# Patient Record
(demographics unavailable — no encounter records)

---

## 2024-12-01 NOTE — LETTER BODY
[Dear  ___] : Dear  [unfilled], [Courtesy Letter:] : I had the pleasure of seeing your patient, [unfilled], in my office today. [Please see my note below.] : Please see my note below. [Sincerely,] : Sincerely, [FreeTextEntry3] : Denver Meyer MD  of Urology [facility]  [facility] Offices: [address] [facility], [address] [place] [address], [facility] [address] [facility], [place] [address]  TEL: [phone] FAX: [phone]

## 2024-12-01 NOTE — HISTORY OF PRESENT ILLNESS
[FreeTextEntry1] : 11/19/2024:  83-year-old male presents for follow-up.  Visit conducted with the help of Thai-speaking staff: HERNAN Lynn.  Taking Flomax and Dutasteride.   Uses Lotrisone cream as needed.  Daytime frequency 3-4 times and nocturia 5-6 times.  Denies dysuria, hematuria, lower abdominal or flank pain, nausea, vomiting, fever, chills or rigors.   09/12/2023: Had to go to [facility] with difficulty urinating a few weeks ago.  Required straight cath. Was told that was medicine related.  Since then, no problem. Was asked to follow up with urologist.  In October 2021 seen for Epididymo-orchitis.   In September 2021 seen for Urinary retention.  Went to [facility] for fever and lower extremity infection.  Had difficulty urinating and required indwelling Romero catheter.   Initially seen for history of Benign Prostatic Hyperplasia.  On Flomax and Dutasteride. Was following with Dr Tim Corado. No longer accepted Insurance.  Reported reasonable stream, urinates 4-5 x or so during the day. Nocturia of 4-5 x.  Denied hesitancy, straining, intermittency, urgency, incontinence, sense of incomplete emptying. Denied dysuria, hematuria, lower abdominal or flank pain, fever, chills or rigors. No family history of Prostate cancer.

## 2024-12-01 NOTE — END OF VISIT
[Time Spent: ___ minutes] : I have spent [unfilled] minutes of time on the encounter which excludes teaching and separately reported services. [FreeTextEntry3] : Parts of this note were generated by using [employer] services and Dragon medical dictation software.  A reasonable effort was made to proofread and correct its contents, but typos and mistakes may still remain. If there are any questions or points of clarification needed, please contact my office.   Prior to appointment and during encounter with patient extensive medical records were reviewed including but not limited to, hospital records, outpatient records, imaging results and lab data if available. During this appointment the patient was examined, diagnoses were discussed and explained in a face-to-face manner. In addition, extensive time was spent reviewing aforementioned diagnostic studies. Counseling including test results, differential diagnoses, treatment options, risk and benefits, lifestyle changes, current condition, and current medications was performed. Patient's questions and concerns were addressed. Patient verbalized understanding of the treatment plan. Time spent is for reviewing chart, labs and images if available, counseling and care coordination.

## 2024-12-01 NOTE — LETTER BODY
If you pass out or start to throw blood or have saba blood in the stools please call 911 and go to nearest er    [Dear  ___] : Dear  [unfilled], [Courtesy Letter:] : I had the pleasure of seeing your patient, [unfilled], in my office today. [Please see my note below.] : Please see my note below. [Sincerely,] : Sincerely, [FreeTextEntry3] : Denver Meyer MD  of Urology [facility]  [facility] Offices: [address]", [address] [place] [address], [facility] [address] [facility], [place] [address]  TEL: [phone] FAX: [phone]

## 2024-12-01 NOTE — HISTORY OF PRESENT ILLNESS
[FreeTextEntry1] : 11/19/2024:  83-year-old male presents for follow-up.  Visit conducted with the help of Malay-speaking staff: HERNAN Lynn.  Taking Flomax and Dutasteride.   Uses Lotrisone cream as needed.  Daytime frequency 3-4 times and nocturia 5-6 times.  Denies dysuria, hematuria, lower abdominal or flank pain, nausea, vomiting, fever, chills or rigors.   09/12/2023: Had to go to [facility] with difficulty urinating a few weeks ago.  Required straight cath. Was told that was medicine related.  Since then, no problem. Was asked to follow up with urologist.  In October 2021 seen for Epididymo-orchitis.   In September 2021 seen for Urinary retention.  Went to [facility] for fever and lower extremity infection.  Had difficulty urinating and required indwelling Romero catheter.   Initially seen for history of Benign Prostatic Hyperplasia.  On Flomax and Dutasteride. Was following with Dr Tim Corado. No longer accepted Insurance.  Reported reasonable stream, urinates 4-5 x or so during the day. Nocturia of 4-5 x.  Denied hesitancy, straining, intermittency, urgency, incontinence, sense of incomplete emptying. Denied dysuria, hematuria, lower abdominal or flank pain, fever, chills or rigors. No family history of Prostate cancer.

## 2024-12-01 NOTE — ASSESSMENT
[FreeTextEntry1] : Benign Prostatic Hyperplasia: Did not have to urinate today for uroflow.   Discussed treatment options. Will continue Flomax and Dutasteride.    Balanoposthitis: Discussed treatment options. Will continue Lotrisone cream PRN.    Return to office in 6 months or sooner if there are any issues. Will do uroflow and check PVR.

## 2024-12-01 NOTE — ADDENDUM
[FreeTextEntry1] :  Deanna HER assisted in documentation on 11/19/2024  acting as a scribe for Dr. Denver Meyer.

## 2024-12-01 NOTE — REASON FOR VISIT
[Follow-up Visit ___] : a follow-up visit  for [unfilled] [Interpreters_IDNumber] : [id number] [Interpreters_FullName] :    Rl  [FreeTextEntry3] : Kuwaiti

## 2024-12-01 NOTE — END OF VISIT
[Time Spent: ___ minutes] : I have spent [unfilled] minutes of time on the encounter which excludes teaching and separately reported services. [FreeTextEntry3] : Parts of this note were generated by using [employer] services and Dragon medical dictation software.  A reasonable effort was made to proofread and correct its contents, but typos and mistakes may still remain. If there are any questions or points of clarification needed, please contact my office.   Prior to appointment and during encounter with patient extensive medical records were reviewed including but not limited to, hospital records, outpatient records, imaging results and lab data if available. During this appointment the patient was examined, diagnoses were discussed and explained in a face-to-face manner. In addition, extensive time was spent reviewing aforementioned diagnostic studies. Counseling including test results, differential diagnoses, treatment options, risk and benefits, lifestyle changes, current condition, and current medications was performed. Patient's questions and concerns were addressed. Patient verbalized understanding of the treatment plan. Time spent is for reviewing chart, labs and images if available, counseling and care coordination.

## 2024-12-01 NOTE — REASON FOR VISIT
[Follow-up Visit ___] : a follow-up visit  for [unfilled] [Interpreters_IDNumber] : [id number] [Interpreters_FullName] :    Rl  [FreeTextEntry3] : Luxembourger

## 2025-03-04 NOTE — REVIEW OF SYSTEMS
[Fatigue] : fatigue [Decreased Appetite] : appetite not decreased [Recent Weight Gain (___ Lbs)] : no recent weight gain [Visual Field Defect] : no visual field defect [Dry Eyes] : no dryness [Eye Pain] : no pain [Dysphagia] : no dysphagia [Neck Pain] : no neck pain [Dysphonia] : no dysphonia [Chest Pain] : no chest pain [Palpitations] : no palpitations [Lower Ext Edema] : no lower extremity edema [Shortness Of Breath] : no shortness of breath [Cough] : no cough [Orthopnea] : no orthopnea [Nausea] : no nausea [Constipation] : no constipation [Abdominal Pain] : no abdominal pain [Vomiting] : no vomiting [Diarrhea] : no diarrhea [Polyuria] : no polyuria [Dysuria] : no dysuria [Joint Pain] : no joint pain [Muscle Weakness] : no muscle weakness [Myalgia] : no myalgia  [Acanthosis] : no acanthosis  [Acne] : no acne [Dry Skin] : no dry skin [Headaches] : no headaches [Dizziness] : no dizziness [Tremors] : no tremors [Depression] : no depression [Insomnia] : no insomnia [Anxiety] : no anxiety [Polydipsia] : no polydipsia [Cold Intolerance] : no cold intolerance [Heat Intolerance] : no heat intolerance [Easy Bleeding] : no ~M tendency for easy bleeding [Easy Bruising] : no tendency for easy bruising

## 2025-05-20 NOTE — ASSESSMENT
[FreeTextEntry1] : Benign Prostatic Hyperplasia: Nocturia: Urinated on floor while urinating for uroflow.  Minimal PVR. Discussed treatment options. Will continue Flomax and Dutasteride.   Recommended to limit evening fluid intake.   Return to office in 1 year or sooner if any issues: will do uroflow and check post void residual.

## 2025-05-20 NOTE — LETTER BODY
[Dear  ___] : Dear  [unfilled], [Courtesy Letter:] : I had the pleasure of seeing your patient, [unfilled], in my office today. [Please see my note below.] : Please see my note below. [Sincerely,] : Sincerely, [FreeTextEntry3] : Denver Meyer MD  of Urology [facility]  [facility] Urology Offices: [address] [facility] [place], [address] [place] [address], [facility] [address] [facility], [place] [address]  TEL: [phone] FAX: [phone]

## 2025-05-20 NOTE — HISTORY OF PRESENT ILLNESS
[FreeTextEntry1] : 5/20/2025: 84-year-old male presents for follow-up.  Taking Flomax and Dutasteride.   Has variable stream. Daytime frequency every 2 to 3 hours and nocturia every 2 hours or so.  Denies urinary incontinence.  Denies dysuria, hematuria, lower abdominal or flank pain, nausea, vomiting, fever, chills or rigors.  No longer using Lotrisone cream.   Seen on 09/12/2023 for follow up.  Had to go to [facility] with difficulty urinating a few weeks ago.  Required straight cath. Was told that was medicine related.  Since then, no problem. Was asked to follow up with urologist.  In October 2021 seen for Epididymo-orchitis.   In September 2021 seen for Urinary retention.  Went to [facility] for fever and lower extremity infection.  Had difficulty urinating and required indwelling Romero catheter.   Initially seen for history of Benign Prostatic Hyperplasia.  On Flomax and Dutasteride. Was following with Dr Tim Corado. No longer accepted Insurance.  Reported reasonable stream, urinates 4-5 x or so during the day. Nocturia of 4-5 x.  Denied hesitancy, straining, intermittency, urgency, incontinence, sense of incomplete emptying. Denied dysuria, hematuria, lower abdominal or flank pain, fever, chills or rigors. No family history of Prostate cancer.

## 2025-05-20 NOTE — REASON FOR VISIT
[Follow-up Visit ___] : a follow-up visit  for [unfilled] [Language Line ] : provided by Language Line   [Interpreters_IDNumber] : [id number] [Interpreters_FullName] : Mel Shine  [FreeTextEntry3] : Moldovan  [TWNoteComboBox1] : Greek